# Patient Record
Sex: FEMALE | Race: WHITE | NOT HISPANIC OR LATINO | Employment: FULL TIME | URBAN - METROPOLITAN AREA
[De-identification: names, ages, dates, MRNs, and addresses within clinical notes are randomized per-mention and may not be internally consistent; named-entity substitution may affect disease eponyms.]

---

## 2017-03-01 ENCOUNTER — HOSPITAL ENCOUNTER (EMERGENCY)
Facility: MEDICAL CENTER | Age: 22
End: 2017-03-01
Attending: EMERGENCY MEDICINE
Payer: MEDICAID

## 2017-03-01 ENCOUNTER — APPOINTMENT (OUTPATIENT)
Dept: RADIOLOGY | Facility: MEDICAL CENTER | Age: 22
End: 2017-03-01
Attending: EMERGENCY MEDICINE
Payer: MEDICAID

## 2017-03-01 VITALS
RESPIRATION RATE: 16 BRPM | OXYGEN SATURATION: 94 % | SYSTOLIC BLOOD PRESSURE: 124 MMHG | WEIGHT: 170.42 LBS | TEMPERATURE: 97.3 F | DIASTOLIC BLOOD PRESSURE: 81 MMHG | HEART RATE: 89 BPM | HEIGHT: 70 IN | BODY MASS INDEX: 24.4 KG/M2

## 2017-03-01 DIAGNOSIS — Z3A.01 LESS THAN 8 WEEKS GESTATION OF PREGNANCY: ICD-10-CM

## 2017-03-01 LAB
ALBUMIN SERPL BCP-MCNC: 4.7 G/DL (ref 3.2–4.9)
ALBUMIN/GLOB SERPL: 1.5 G/DL
ALP SERPL-CCNC: 59 U/L (ref 30–99)
ALT SERPL-CCNC: 23 U/L (ref 2–50)
ANION GAP SERPL CALC-SCNC: 11 MMOL/L (ref 0–11.9)
APPEARANCE UR: ABNORMAL
AST SERPL-CCNC: 17 U/L (ref 12–45)
B-HCG SERPL-ACNC: 1022.6 MIU/ML (ref 0–5)
BACTERIA #/AREA URNS HPF: ABNORMAL /HPF
BASOPHILS # BLD AUTO: 0.4 % (ref 0–1.8)
BASOPHILS # BLD: 0.04 K/UL (ref 0–0.12)
BILIRUB SERPL-MCNC: 0.4 MG/DL (ref 0.1–1.5)
BILIRUB UR QL STRIP.AUTO: NEGATIVE
BUN SERPL-MCNC: 8 MG/DL (ref 8–22)
CALCIUM SERPL-MCNC: 9.9 MG/DL (ref 8.5–10.5)
CHLORIDE SERPL-SCNC: 106 MMOL/L (ref 96–112)
CO2 SERPL-SCNC: 23 MMOL/L (ref 20–33)
COLOR UR: ABNORMAL
CREAT SERPL-MCNC: 0.83 MG/DL (ref 0.5–1.4)
EOSINOPHIL # BLD AUTO: 0.05 K/UL (ref 0–0.51)
EOSINOPHIL NFR BLD: 0.5 % (ref 0–6.9)
EPI CELLS #/AREA URNS HPF: ABNORMAL /HPF
ERYTHROCYTE [DISTWIDTH] IN BLOOD BY AUTOMATED COUNT: 39.5 FL (ref 35.9–50)
GFR SERPL CREATININE-BSD FRML MDRD: >60 ML/MIN/1.73 M 2
GLOBULIN SER CALC-MCNC: 3.2 G/DL (ref 1.9–3.5)
GLUCOSE SERPL-MCNC: 88 MG/DL (ref 65–99)
GLUCOSE UR STRIP.AUTO-MCNC: NEGATIVE MG/DL
HCT VFR BLD AUTO: 44.3 % (ref 37–47)
HGB BLD-MCNC: 14.6 G/DL (ref 12–16)
IMM GRANULOCYTES # BLD AUTO: 0.04 K/UL (ref 0–0.11)
IMM GRANULOCYTES NFR BLD AUTO: 0.4 % (ref 0–0.9)
KETONES UR STRIP.AUTO-MCNC: NEGATIVE MG/DL
LEUKOCYTE ESTERASE UR QL STRIP.AUTO: ABNORMAL
LYMPHOCYTES # BLD AUTO: 2.42 K/UL (ref 1–4.8)
LYMPHOCYTES NFR BLD: 26.4 % (ref 22–41)
MCH RBC QN AUTO: 29.4 PG (ref 27–33)
MCHC RBC AUTO-ENTMCNC: 33 G/DL (ref 33.6–35)
MCV RBC AUTO: 89.1 FL (ref 81.4–97.8)
MICRO URNS: ABNORMAL
MONOCYTES # BLD AUTO: 0.46 K/UL (ref 0–0.85)
MONOCYTES NFR BLD AUTO: 5 % (ref 0–13.4)
MUCOUS THREADS #/AREA URNS HPF: ABNORMAL /HPF
NEUTROPHILS # BLD AUTO: 6.15 K/UL (ref 2–7.15)
NEUTROPHILS NFR BLD: 67.3 % (ref 44–72)
NITRITE UR QL STRIP.AUTO: NEGATIVE
NRBC # BLD AUTO: 0 K/UL
NRBC BLD AUTO-RTO: 0 /100 WBC
NUMBER OF RH DOSES IND 8505RD: NORMAL
PH UR STRIP.AUTO: 6 [PH]
PLATELET # BLD AUTO: 280 K/UL (ref 164–446)
PMV BLD AUTO: 9.9 FL (ref 9–12.9)
POTASSIUM SERPL-SCNC: 3.5 MMOL/L (ref 3.6–5.5)
PROT SERPL-MCNC: 7.9 G/DL (ref 6–8.2)
PROT UR QL STRIP: NEGATIVE MG/DL
RBC # BLD AUTO: 4.97 M/UL (ref 4.2–5.4)
RBC # URNS HPF: ABNORMAL /HPF
RBC UR QL AUTO: ABNORMAL
RH BLD: NORMAL
SODIUM SERPL-SCNC: 140 MMOL/L (ref 135–145)
SP GR UR STRIP.AUTO: 1.01
WBC # BLD AUTO: 9.2 K/UL (ref 4.8–10.8)
WBC #/AREA URNS HPF: ABNORMAL /HPF

## 2017-03-01 PROCEDURE — 86901 BLOOD TYPING SEROLOGIC RH(D): CPT

## 2017-03-01 PROCEDURE — 85025 COMPLETE CBC W/AUTO DIFF WBC: CPT

## 2017-03-01 PROCEDURE — 36415 COLL VENOUS BLD VENIPUNCTURE: CPT

## 2017-03-01 PROCEDURE — 99284 EMERGENCY DEPT VISIT MOD MDM: CPT

## 2017-03-01 PROCEDURE — 76817 TRANSVAGINAL US OBSTETRIC: CPT

## 2017-03-01 PROCEDURE — 84702 CHORIONIC GONADOTROPIN TEST: CPT

## 2017-03-01 PROCEDURE — 81001 URINALYSIS AUTO W/SCOPE: CPT

## 2017-03-01 PROCEDURE — 80053 COMPREHEN METABOLIC PANEL: CPT

## 2017-03-01 RX ORDER — NITROFURANTOIN 25; 75 MG/1; MG/1
100 CAPSULE ORAL 2 TIMES DAILY
Qty: 20 CAP | Refills: 0 | Status: SHIPPED | OUTPATIENT
Start: 2017-03-01 | End: 2017-12-18

## 2017-03-01 ASSESSMENT — LIFESTYLE VARIABLES
CONSUMPTION TOTAL: INCOMPLETE
HAVE YOU EVER FELT YOU SHOULD CUT DOWN ON YOUR DRINKING: NO
EVER HAD A DRINK FIRST THING IN THE MORNING TO STEADY YOUR NERVES TO GET RID OF A HANGOVER: NO
DO YOU DRINK ALCOHOL: YES
HAVE PEOPLE ANNOYED YOU BY CRITICIZING YOUR DRINKING: NO
TOTAL SCORE: 0
TOTAL SCORE: 0
EVER FELT BAD OR GUILTY ABOUT YOUR DRINKING: NO
TOTAL SCORE: 0

## 2017-03-01 ASSESSMENT — PAIN SCALES - GENERAL: PAINLEVEL_OUTOF10: 0

## 2017-03-01 NOTE — ED AVS SNAPSHOT
3/1/2017          Camila Enamorado  [address] [place] [address]    Dear Camila:    [facility] wants to ensure your discharge home is safe and you or your loved ones have had all your questions answered regarding your care after you leave the hospital.    You may receive a telephone call within two days of your discharge.  This call is to make certain you understand your discharge instructions as well as ensure we provided you with the best care possible during your stay with us.     The call will only last approximately 3-5 minutes and will be done by a nurse.    Once again, we want to ensure your discharge home is safe and that you have a clear understanding of any next steps in your care.  If you have any questions or concerns, please do not hesitate to contact us, we are here for you.  Thank you for choosing [facility] for your healthcare needs.    Sincerely,    Jimmy Menjivar    [facility]

## 2017-03-01 NOTE — ED AVS SNAPSHOT
[employer] Access Code: [email or web address]  Expires: 3/31/2017 10:19 PM    [employer]  A secure, online tool to manage your health information     [employer]’s [employer]® is a secure, online tool that connects you to your personalized health information from the privacy of your home -- day or night - making it very easy for you to manage your healthcare. Once the activation process is completed, you can even access your medical information using the [employer] matt, which is available for free in the Apple Matt store or Google Play store.     [employer] provides the following levels of access (as shown below):   My Chart Features   [facility] Primary Care Doctor [facility]  Specialists [facility]  Urgent  Care Non-[facility]  Primary Care  Doctor   Email your healthcare team securely and privately 24/7 X X X X   Manage appointments: schedule your next appointment; view details of past/upcoming appointments X      Request prescription refills. X      View recent personal medical records, including lab and immunizations X X X X   View health record, including health history, allergies, medications X X X X   Read reports about your outpatient visits, procedures, consult and ER notes X X X X   See your discharge summary, which is a recap of your hospital and/or ER visit that includes your diagnosis, lab results, and care plan. X X       How to register for [employer]:  1. Go to  [email or web address].  2. Click on the Sign Up Now box, which takes you to the New Member Sign Up page. You will need to provide the following information:  a. Enter your [employer] Access Code exactly as it appears at the top of this page. (You will not need to use this code after you’ve completed the sign-up process. If you do not sign up before the expiration date, you must request a new code.)   b. Enter your date of birth.   c. Enter your home email address.   d. Click Submit, and follow the next screen’s instructions.  3. Create a [employer] ID. This will be your [employer]  login ID and cannot be changed, so think of one that is secure and easy to remember.  4. Create a [facility] password. You can change your password at any time.  5. Enter your Password Reset Question and Answer. This can be used at a later time if you forget your password.   6. Enter your e-mail address. This allows you to receive e-mail notifications when new information is available in [facility].  7. Click Sign Up. You can now view your health information.    For assistance activating your [facility] account, call [phone]

## 2017-03-01 NOTE — ED AVS SNAPSHOT
Home Care Instructions                                                                                                                Camila Enamorado   MRN: [id number]    Department:  [facility], Emergency Dept   Date of Visit:  3/1/2017            [facility], Emergency Dept    [address] [place] [id number]    Phone:  [phone]      You were seen by     Arpit Koehler M.D.      Your Diagnosis Was     Less than 8 weeks gestation of pregnancy     Z3A.01       Follow-up Information     1. Follow up with [facility], Emergency Dept In 2 days.    Specialty:  Emergency Medicine    Why:  return sooner for increasing pain progressive vaginal bleeding or other concerns    Contact information    [address] [place] [id number] [phone]      Medication Information     Review all of your home medications and newly ordered medications with your primary doctor and/or pharmacist as soon as possible. Follow medication instructions as directed by your doctor and/or pharmacist.     Please keep your complete medication list with you and share with your physician. Update the information when medications are discontinued, doses are changed, or new medications (including over-the-counter products) are added; and carry medication information at all times in the event of emergency situations.               Medication List      START taking these medications        Instructions    nitrofurantoin monohydr macro 100 MG Caps   Commonly known as:  MACROBID    Take 1 Cap by mouth 2 times a day.   Dose:  100 mg         ASK your doctor about these medications        Instructions    PRENATAL 1 PO    Take  by mouth.               Procedures and tests performed during your visit     CBC WITH DIFFERENTIAL    COMP METABOLIC PANEL    ESTIMATED GFR    HCG QUANTITATIVE SERUM    RH TYPE FOR RHOGAM FROM E.D.    Set Up for Pelvic Exam    URINALYSIS (UA)    URINE MICROSCOPIC (W/UA)     US-OB PELVIS TRANSVAGINAL        Discharge Instructions       Follow up with your primary care physician for re-evaluation of your blood pressure.Pregnancy  If you are planning on getting pregnant, it is a good idea to make a preconception appointment with your caregiver to discuss having a healthy lifestyle before getting pregnant. This includes diet, weight, exercise, taking prenatal vitamins (especially folic acid, which helps prevent brain and spinal cord defects), avoiding alcohol, smoking and illegal drugs, medical problems (diabetes, convulsions), family history of genetic problems, working conditions, and immunizations. It is better to have knowledge of these things and do something about them before getting pregnant.  During your pregnancy, it is important to follow certain guidelines in order to have a healthy baby. It is very important to get good prenatal care and follow your caregiver's instructions. Prenatal care includes all the medical care you receive before your baby's birth. This helps to prevent problems during the pregnancy and childbirth.  HOME CARE INSTRUCTIONS   · Start your prenatal visits by the 12th week of pregnancy or earlier, if possible. At first, appointments are usually scheduled monthly. They become more frequent in the last 2 months before delivery. It is important that you keep your caregiver's appointments and follow your caregiver's instructions regarding medication use, exercise, and diet.  · During pregnancy, you are providing food for you and your baby. Eat a regular, well-balanced diet. Choose foods such as meat, fish, milk and other dairy products, vegetables, fruits, whole-grain breads and cereals. Your caregiver will inform you of the ideal weight gain depending on your current height and weight. Drink lots of liquids. Try to drink 8 glasses of water a day.  · Alcohol is associated with a number of birth defects including fetal alcohol syndrome. It is best to avoid  alcohol completely. Smoking will cause low birth rate and prematurity. Use of alcohol and nicotine during your pregnancy also increases the chances that your child will be chemically dependent later in their life and may contribute to SIDS (Sudden Infant Death Syndrome).  · Do not use illegal drugs.  · Only take prescription or over-the-counter medications that are recommended by your caregiver. Other medications can cause genetic and physical problems in the baby.  · Morning sickness can often be helped by keeping soda crackers at the bedside. Eat a few before getting up in the morning.  · A sexual relationship may be continued until near the end of pregnancy if there are no other problems such as early (premature) leaking of amniotic fluid from the membranes, vaginal bleeding, painful intercourse or belly (abdominal) pain.  · Exercise regularly. Check with your caregiver if you are unsure of the safety of some of your exercises.  · Do not use hot tubs, steam rooms or saunas. These increase the risk of fainting and hurting yourself and the baby. Swimming is OK for exercise. Get plenty of rest, including afternoon naps when possible, especially in the third trimester.  · Avoid toxic odors and chemicals.  · Do not wear high heels. They may cause you to lose your balance and fall.  · Do not lift over 5 pounds. If you do lift anything, lift with your legs and thighs, not your back.  · Avoid long trips, especially in the third trimester.  · If you have to travel out of the city or state, take a copy of your medical records with you.  SEEK IMMEDIATE MEDICAL CARE IF:   · You develop an unexplained oral temperature above 102° F (38.9° C), or as your caregiver suggests.  · You have leaking of fluid from the vagina. If leaking membranes are suspected, take your temperature and inform your caregiver of this when you call.  · There is vaginal spotting or bleeding. Notify your caregiver of the amount and how many pads are  used.  · You continue to feel sick to your stomach (nauseous) and have no relief from remedies suggested, or you throw up (vomit) blood or coffee ground like materials.  · You develop upper abdominal pain.  · You have round ligament discomfort in the lower abdominal area. This still must be evaluated by your caregiver.  · You feel contractions of the uterus.  · You do not feel the baby move, or there is less movement than before.  · You have painful urination.  · You have abnormal vaginal discharge.  · You have persistent diarrhea.  · You get a severe headache.  · You have problems with your vision.  · You develop muscle weakness.  · You feel dizzy and faint.  · You develop shortness of breath.  · You develop chest pain.  · You have back pain that travels down to your leg and feet.  · You feel irregular or a very fast heartbeat.  · You develop excessive weight gain in a short period of time (5 pounds in 3 to 5 days).  · You are involved in a domestic violence situation.  Document Released: 12/18/2006 Document Revised: 06/18/2013 Document Reviewed: 06/11/2010  [employer]® Patient Information ©2014 [employer].            Patient Information     Patient Information    Following emergency treatment: all patient requiring follow-up care must return either to a private physician or a clinic if your condition worsens before you are able to obtain further medical attention, please return to the emergency room.     Billing Information    At [facility], we work to make the billing process streamlined for our patients.  Our Representatives are here to answer any questions you may have regarding your hospital bill.  If you have insurance coverage and have supplied your insurance information to us, we will submit a claim to your insurer on your behalf.  Should you have any questions regarding your bill, we can be reached online or by phone as follows:  Online: You are able pay your bills online or live chat with our  representatives about any billing questions you may have. We are here to help Monday - Friday from 8:00am to 7:30pm and 9:00am - 12:00pm on Saturdays.  Please visit [email or web address]  for more information.   Phone:  [phone] or [phone]    Please note that your emergency physician, surgeon, pathologist, radiologist, anesthesiologist, and other specialists are not employed by [facility] and will therefore bill separately for their services.  Please contact them directly for any questions concerning their bills at the numbers below:     Emergency Physician Services:  [phone]  [facility]:  [phone]  [facility]:  [phone]  [facility]:  [phone]    1. Your final bill may vary from the amount quoted upon discharge if all procedures are not complete at that time, or if your doctor has additional procedures of which we are not aware. You will receive an additional bill if you return to the Emergency Department at [facility] for suture removal regardless of the facility of which the sutures were placed.     2. Please arrange for settlement of this account at the emergency registration.    3. All self-pay accounts are due in full at the time of treatment.  If you are unable to meet this obligation then payment is expected within 4-5 days.     4. If you have had radiology studies (CT, X-ray, Ultrasound, MRI), you have received a preliminary result during your emergency department visit. Please contact the radiology department [phone] to receive a copy of your final result. Please discuss the Final result with your primary physician or with the follow up physician provided.     Crisis Hotline:  [place] Crisis Hotline:  3-876-NQRXTBQ or 1-671.430.7136  [place] Crisis Hotline:    1-531.973.3377 or 881-732-3263         ED Discharge Follow Up Questions    1. In order to provide you with very good care, we would  like to follow up with a phone call in the next few days.  May we have your permission to contact you?     YES /  NO    2. What is the best phone number to call you? (       )_____-__________    3. What is the best time to call you?      Morning  /  Afternoon  /  Evening                   Patient Signature:  ____________________________________________________________    Date:  ____________________________________________________________

## 2017-03-02 NOTE — ED NOTES
Discharge orders received, IV and monitor discontinued, instructions and education given, follow-up appointments discussed, prescription x1 given, pt verbalized understanding.

## 2017-03-02 NOTE — DISCHARGE INSTRUCTIONS
Follow up with your primary care physician for re-evaluation of your blood pressure.Pregnancy  If you are planning on getting pregnant, it is a good idea to make a preconception appointment with your caregiver to discuss having a healthy lifestyle before getting pregnant. This includes diet, weight, exercise, taking prenatal vitamins (especially folic acid, which helps prevent brain and spinal cord defects), avoiding alcohol, smoking and illegal drugs, medical problems (diabetes, convulsions), family history of genetic problems, working conditions, and immunizations. It is better to have knowledge of these things and do something about them before getting pregnant.  During your pregnancy, it is important to follow certain guidelines in order to have a healthy baby. It is very important to get good prenatal care and follow your caregiver's instructions. Prenatal care includes all the medical care you receive before your baby's birth. This helps to prevent problems during the pregnancy and childbirth.  HOME CARE INSTRUCTIONS   · Start your prenatal visits by the 12th week of pregnancy or earlier, if possible. At first, appointments are usually scheduled monthly. They become more frequent in the last 2 months before delivery. It is important that you keep your caregiver's appointments and follow your caregiver's instructions regarding medication use, exercise, and diet.  · During pregnancy, you are providing food for you and your baby. Eat a regular, well-balanced diet. Choose foods such as meat, fish, milk and other dairy products, vegetables, fruits, whole-grain breads and cereals. Your caregiver will inform you of the ideal weight gain depending on your current height and weight. Drink lots of liquids. Try to drink 8 glasses of water a day.  · Alcohol is associated with a number of birth defects including fetal alcohol syndrome. It is best to avoid alcohol completely. Smoking will cause low birth rate and  prematurity. Use of alcohol and nicotine during your pregnancy also increases the chances that your child will be chemically dependent later in their life and may contribute to SIDS (Sudden Infant Death Syndrome).  · Do not use illegal drugs.  · Only take prescription or over-the-counter medications that are recommended by your caregiver. Other medications can cause genetic and physical problems in the baby.  · Morning sickness can often be helped by keeping soda crackers at the bedside. Eat a few before getting up in the morning.  · A sexual relationship may be continued until near the end of pregnancy if there are no other problems such as early (premature) leaking of amniotic fluid from the membranes, vaginal bleeding, painful intercourse or belly (abdominal) pain.  · Exercise regularly. Check with your caregiver if you are unsure of the safety of some of your exercises.  · Do not use hot tubs, steam rooms or saunas. These increase the risk of fainting and hurting yourself and the baby. Swimming is OK for exercise. Get plenty of rest, including afternoon naps when possible, especially in the third trimester.  · Avoid toxic odors and chemicals.  · Do not wear high heels. They may cause you to lose your balance and fall.  · Do not lift over 5 pounds. If you do lift anything, lift with your legs and thighs, not your back.  · Avoid long trips, especially in the third trimester.  · If you have to travel out of the city or state, take a copy of your medical records with you.  SEEK IMMEDIATE MEDICAL CARE IF:   · You develop an unexplained oral temperature above 102° F (38.9° C), or as your caregiver suggests.  · You have leaking of fluid from the vagina. If leaking membranes are suspected, take your temperature and inform your caregiver of this when you call.  · There is vaginal spotting or bleeding. Notify your caregiver of the amount and how many pads are used.  · You continue to feel sick to your stomach (nauseous)  and have no relief from remedies suggested, or you throw up (vomit) blood or coffee ground like materials.  · You develop upper abdominal pain.  · You have round ligament discomfort in the lower abdominal area. This still must be evaluated by your caregiver.  · You feel contractions of the uterus.  · You do not feel the baby move, or there is less movement than before.  · You have painful urination.  · You have abnormal vaginal discharge.  · You have persistent diarrhea.  · You get a severe headache.  · You have problems with your vision.  · You develop muscle weakness.  · You feel dizzy and faint.  · You develop shortness of breath.  · You develop chest pain.  · You have back pain that travels down to your leg and feet.  · You feel irregular or a very fast heartbeat.  · You develop excessive weight gain in a short period of time (5 pounds in 3 to 5 days).  · You are involved in a domestic violence situation.  Document Released: 12/18/2006 Document Revised: 06/18/2013 Document Reviewed: 06/11/2010  [employer]® Patient Information ©2014 [employer], [employer].

## 2017-03-02 NOTE — ED PROVIDER NOTES
ED Provider Note    Scribed for Arpit Koehler M.D. by Yousuf Bird. 3/1/2017, 7:54 PM.    Primary care provider: SAWYER Walker  Means of arrival: Walk-in  History obtained from: Patient  History limited by: None    CHIEF COMPLAINT  Chief Complaint   Patient presents with   • Abdominal Cramping   • Vaginal Bleeding   • Pregnancy     approx 6 wks pregnant       ALIYA Enamorado is a 21 y.o. female who presents to the Emergency Department with abdominal cramping, back pain, and vaginal bleeding onset 3 nights ago. The patient reports she is currently 6 weeks pregnant and reports of experiencing a small amount of vaginal bleeding 3 nights ago with associated abdominal cramping and back pain. She reports moderate amount of bleeding yesterday with small pieces of clots present, but states the bleeding began to lighten up today. Patient notes intermittent lightheadedness, but denies nausea, vomiting, or diarrhea. She is G1, with last menstrual period 1/17/2017. She notes allergy to penicillin.     REVIEW OF SYSTEMS  Pertinent positives include vaginal bleeding, abdominal cramping, back pain, and intermittent lightheadedness. Pertinent negatives include no nausea, vomiting, and diarrhea.  All other systems reviewed and negative. C.     PAST MEDICAL HISTORY  She is G1.    SURGICAL HISTORY  patient denies any surgical history    SOCIAL HISTORY  Social History   Substance Use Topics   • Smoking status: Never Smoker    • Smokeless tobacco: None   • Alcohol Use: No      History   Drug Use No       FAMILY HISTORY  Family History   Problem Relation Age of Onset   • Cancer Mother      hodkins lymphoma       CURRENT MEDICATIONS  Home Medications     Reviewed by Jane Degroot R.N. (Registered Nurse) on 03/01/17 at 1646  Med List Status: Partial    Medication Last Dose Status    Prenatal MV-Min-Fe Fum-FA-DHA (PRENATAL 1 PO) 3/1/2017 Active                ALLERGIES  Allergies   Allergen Reactions   • Pcn [Penicillins]   "      PHYSICAL EXAM  VITAL SIGNS: /82 mmHg  Pulse 104  Temp(Src) 36.3 °C (97.3 °F)  Resp 18  Ht 1.778 m (5' 10\")  Wt 77.3 kg (170 lb 6.7 oz)  BMI 24.45 kg/m2  SpO2 100%  LMP 01/17/2017    Constitutional: Well developed, Well nourished, No acute distress, Non-toxic appearance.   HENT: Normocephalic, Atraumatic, mucous membranes moist, no erythema, exudates, swelling, or masses, nares patent  Eyes: nonicteric  Neck: Supple, no meningismus  Lymphatic: No lymphadenopathy noted.   Cardiovascular: Regular rate and rhythm, no gallops rubs or murmurs  Lungs: Clear bilaterally   Abdomen: Bowel sounds normal, Soft, No tenderness  Skin: Warm, Dry, no rash  Back: No tenderness, No CVA tenderness.   Pelvic Exam: Assisted by female nurse, cervical os closed, minimal bleeding.   Rectal: Deferred  Extremities: No edema  Neurologic: Alert, appropriate, follows commands, moving all extremities, normal speech   Psychiatric: Affect normal    DIAGNOSTIC STUDIES / PROCEDURES    LABS  Results for orders placed or performed during the hospital encounter of 03/01/17   CBC WITH DIFFERENTIAL   Result Value Ref Range    WBC 9.2 4.8 - 10.8 K/uL    RBC 4.97 4.20 - 5.40 M/uL    Hemoglobin 14.6 12.0 - 16.0 g/dL    Hematocrit 44.3 37.0 - 47.0 %    MCV 89.1 81.4 - 97.8 fL    MCH 29.4 27.0 - 33.0 pg    MCHC 33.0 (L) 33.6 - 35.0 g/dL    RDW 39.5 35.9 - 50.0 fL    Platelet Count 280 164 - 446 K/uL    MPV 9.9 9.0 - 12.9 fL    Neutrophils-Polys 67.30 44.00 - 72.00 %    Lymphocytes 26.40 22.00 - 41.00 %    Monocytes 5.00 0.00 - 13.40 %    Eosinophils 0.50 0.00 - 6.90 %    Basophils 0.40 0.00 - 1.80 %    Immature Granulocytes 0.40 0.00 - 0.90 %    Nucleated RBC 0.00 /100 WBC    Neutrophils (Absolute) 6.15 2.00 - 7.15 K/uL    Lymphs (Absolute) 2.42 1.00 - 4.80 K/uL    Monos (Absolute) 0.46 0.00 - 0.85 K/uL    Eos (Absolute) 0.05 0.00 - 0.51 K/uL    Baso (Absolute) 0.04 0.00 - 0.12 K/uL    Immature Granulocytes (abs) 0.04 0.00 - 0.11 K/uL    " NRBC (Absolute) 0.00 K/uL   COMP METABOLIC PANEL   Result Value Ref Range    Sodium 140 135 - 145 mmol/L    Potassium 3.5 (L) 3.6 - 5.5 mmol/L    Chloride 106 96 - 112 mmol/L    Co2 23 20 - 33 mmol/L    Anion Gap 11.0 0.0 - 11.9    Glucose 88 65 - 99 mg/dL    Bun 8 8 - 22 mg/dL    Creatinine 0.83 0.50 - 1.40 mg/dL    Calcium 9.9 8.5 - 10.5 mg/dL    AST(SGOT) 17 12 - 45 U/L    ALT(SGPT) 23 2 - 50 U/L    Alkaline Phosphatase 59 30 - 99 U/L    Total Bilirubin 0.4 0.1 - 1.5 mg/dL    Albumin 4.7 3.2 - 4.9 g/dL    Total Protein 7.9 6.0 - 8.2 g/dL    Globulin 3.2 1.9 - 3.5 g/dL    A-G Ratio 1.5 g/dL   URINALYSIS (UA)   Result Value Ref Range    Micro Urine Req Microscopic     Color Lt. Yellow     Character Sl Cloudy (A)     Specific Gravity 1.013 <1.035    Ph 6.0 5.0-8.0    Glucose Negative Negative mg/dL    Ketones Negative Negative mg/dL    Protein Negative Negative mg/dL    Bilirubin Negative Negative    Nitrite Negative Negative    Leukocyte Esterase Moderate (A) Negative    Occult Blood Moderate (A) Negative   RH TYPE FOR RHOGAM FROM E.D.   Result Value Ref Range    Emergency Department Rh Typing POS     Number Of Rh Doses Indicated ZERO    HCG QUANTITATIVE SERUM   Result Value Ref Range    Bhcg 1022.6 (H) 0.0 - 5.0 mIU/mL   ESTIMATED GFR   Result Value Ref Range    GFR If African American >60 >60 mL/min/1.73 m 2    GFR If Non African American >60 >60 mL/min/1.73 m 2   URINE MICROSCOPIC (W/UA)   Result Value Ref Range    WBC 10-20 (A) /hpf    RBC 2-5 (A) /hpf    Bacteria Moderate (A) None /hpf    Epithelial Cells Few /hpf    Mucous Threads Few /hpf   All labs reviewed by me.    RADIOLOGY  US-OB PELVIS TRANSVAGINAL   Final Result      Possible intrauterine gestational sac with mean sac diameter corresponding to an estimated gestational age of 5 weeks 0 days for an LEVAR of 11/1/2017. Fetal pole and yolk sac are not identified. Findings could be consistent with pregnancy too early to    diagnose versus early pregnancy  failure versus anembryonic pregnancy versus ectopic pregnancy. Follow-up is recommended.      The radiologist's interpretation of all radiological studies have been reviewed by me.    COURSE & MEDICAL DECISION MAKING  Nursing notes, VS, PMSFHx reviewed in chart.     8:04 M Patient seen and examined at bedside. Ordered for US-OB pelvis transvaginal, CBC, CMP, urinalysis UA, RH type for rhogam, and HCG quant serum to evaluate.     10:07 PM Reviewed the patient's lab and imaging results, which are noted above. Patient was reevaluated at bedside. She is resting comfortably in bed without medical complaints. Performed pelvic exam with assisted female nurse, see above physical for further details. Discussed lab and radiology results with the patient and informed them of the results, which are shown above. Advised the patient recheck with her PCP in 48 hours. The patient will be discharged  and should return if symptoms worsen or if new symptoms arise. The patient understands and agrees to plan.       Decision Making:  This is a 21 y.o. year old female who presents with vaginal bleeding. Ultrasound demonstrates what may be a gestational sac but there is no yolk sac or fetal pole. Given the low quant at around thousand, it may be too early to see whether this is a completed miscarriage or whether it is too early to tell that there is an intrauterine pregnancy. There is no evidence of ectopic. Patient will be discharged to follow-up in 48 hours for recheck. She also will be treated for UTI.    The patient will return for new or worsening symptoms and is stable at the time of discharge.    The patient is referred to a primary physician for blood pressure management, diabetic screening, and for all other preventative health concerns.    DISPOSITION:  Patient will be discharged home in stable condition.    FOLLOW UP:  [facility], Emergency Dept  [address] [facility] [address] [address] [phone]  In 2  days  return sooner for increasing pain progressive vaginal bleeding or other concerns      OUTPATIENT MEDICATIONS:  New Prescriptions    NITROFURANTOIN MONOHYDR MACRO (MACROBID) 100 MG CAP    Take 1 Cap by mouth 2 times a day.     FINAL IMPRESSION  1. Less than 8 weeks gestation of pregnancy          Yousuf HUFFMAN (Scribe), am scribing for, and in the presence of, Arpit Koehler M.D..    Electronically signed by: Yousuf Bird (Scribe), 3/1/2017    Arpit HUFFMAN M.D. personally performed the services described in this documentation, as scribed by Yousuf Bird in my presence, and it is both accurate and complete.    The note accurately reflects work and decisions made by me.  Arpit Koehler  3/1/2017  10:14 PM

## 2017-03-02 NOTE — ED NOTES
Pt to triage .  Chief Complaint   Patient presents with   • Abdominal Cramping   • Vaginal Bleeding   • Pregnancy     approx 6 wks pregnant

## 2017-03-04 ENCOUNTER — HOSPITAL ENCOUNTER (EMERGENCY)
Facility: MEDICAL CENTER | Age: 22
End: 2017-03-04
Attending: EMERGENCY MEDICINE
Payer: MEDICAID

## 2017-03-04 ENCOUNTER — APPOINTMENT (OUTPATIENT)
Dept: RADIOLOGY | Facility: MEDICAL CENTER | Age: 22
End: 2017-03-04
Attending: EMERGENCY MEDICINE
Payer: MEDICAID

## 2017-03-04 VITALS
DIASTOLIC BLOOD PRESSURE: 63 MMHG | RESPIRATION RATE: 16 BRPM | TEMPERATURE: 97.8 F | WEIGHT: 171.74 LBS | BODY MASS INDEX: 24.59 KG/M2 | HEART RATE: 82 BPM | SYSTOLIC BLOOD PRESSURE: 108 MMHG | HEIGHT: 70 IN | OXYGEN SATURATION: 100 %

## 2017-03-04 DIAGNOSIS — O20.9 VAGINAL BLEEDING IN PREGNANCY, FIRST TRIMESTER: ICD-10-CM

## 2017-03-04 LAB
ANION GAP SERPL CALC-SCNC: 4 MMOL/L (ref 0–11.9)
B-HCG SERPL-ACNC: 1137.1 MIU/ML (ref 0–5)
BASOPHILS # BLD AUTO: 0.4 % (ref 0–1.8)
BASOPHILS # BLD: 0.03 K/UL (ref 0–0.12)
BUN SERPL-MCNC: 7 MG/DL (ref 8–22)
CALCIUM SERPL-MCNC: 9.5 MG/DL (ref 8.5–10.5)
CHLORIDE SERPL-SCNC: 105 MMOL/L (ref 96–112)
CO2 SERPL-SCNC: 26 MMOL/L (ref 20–33)
CREAT SERPL-MCNC: 0.68 MG/DL (ref 0.5–1.4)
EOSINOPHIL # BLD AUTO: 0.08 K/UL (ref 0–0.51)
EOSINOPHIL NFR BLD: 1.1 % (ref 0–6.9)
ERYTHROCYTE [DISTWIDTH] IN BLOOD BY AUTOMATED COUNT: 39.6 FL (ref 35.9–50)
GFR SERPL CREATININE-BSD FRML MDRD: >60 ML/MIN/1.73 M 2
GLUCOSE SERPL-MCNC: 95 MG/DL (ref 65–99)
HCT VFR BLD AUTO: 42.2 % (ref 37–47)
HGB BLD-MCNC: 14 G/DL (ref 12–16)
IMM GRANULOCYTES # BLD AUTO: 0.02 K/UL (ref 0–0.11)
IMM GRANULOCYTES NFR BLD AUTO: 0.3 % (ref 0–0.9)
LYMPHOCYTES # BLD AUTO: 1.56 K/UL (ref 1–4.8)
LYMPHOCYTES NFR BLD: 21.1 % (ref 22–41)
MCH RBC QN AUTO: 29.9 PG (ref 27–33)
MCHC RBC AUTO-ENTMCNC: 33.2 G/DL (ref 33.6–35)
MCV RBC AUTO: 90 FL (ref 81.4–97.8)
MONOCYTES # BLD AUTO: 0.4 K/UL (ref 0–0.85)
MONOCYTES NFR BLD AUTO: 5.4 % (ref 0–13.4)
NEUTROPHILS # BLD AUTO: 5.29 K/UL (ref 2–7.15)
NEUTROPHILS NFR BLD: 71.7 % (ref 44–72)
NRBC # BLD AUTO: 0 K/UL
NRBC BLD AUTO-RTO: 0 /100 WBC
PLATELET # BLD AUTO: 249 K/UL (ref 164–446)
PMV BLD AUTO: 9.9 FL (ref 9–12.9)
POTASSIUM SERPL-SCNC: 3.8 MMOL/L (ref 3.6–5.5)
RBC # BLD AUTO: 4.69 M/UL (ref 4.2–5.4)
SODIUM SERPL-SCNC: 135 MMOL/L (ref 135–145)
WBC # BLD AUTO: 7.4 K/UL (ref 4.8–10.8)

## 2017-03-04 PROCEDURE — 85025 COMPLETE CBC W/AUTO DIFF WBC: CPT

## 2017-03-04 PROCEDURE — 80048 BASIC METABOLIC PNL TOTAL CA: CPT

## 2017-03-04 PROCEDURE — 99284 EMERGENCY DEPT VISIT MOD MDM: CPT

## 2017-03-04 PROCEDURE — 84702 CHORIONIC GONADOTROPIN TEST: CPT

## 2017-03-04 PROCEDURE — 76817 TRANSVAGINAL US OBSTETRIC: CPT

## 2017-03-04 PROCEDURE — 36415 COLL VENOUS BLD VENIPUNCTURE: CPT

## 2017-03-04 NOTE — ED AVS SNAPSHOT
3/4/2017          Camila Enamorado  [address] [place] [address]    Dear Camila:    [facility] wants to ensure your discharge home is safe and you or your loved ones have had all your questions answered regarding your care after you leave the hospital.    You may receive a telephone call within two days of your discharge.  This call is to make certain you understand your discharge instructions as well as ensure we provided you with the best care possible during your stay with us.     The call will only last approximately 3-5 minutes and will be done by a nurse.    Once again, we want to ensure your discharge home is safe and that you have a clear understanding of any next steps in your care.  If you have any questions or concerns, please do not hesitate to contact us, we are here for you.  Thank you for choosing [facility] for your healthcare needs.    Sincerely,    Jimmy Menjivar    [facility]

## 2017-03-04 NOTE — ED AVS SNAPSHOT
Your [employer] Access Code: [email or web address]  Expires: 3/31/2017 10:19 PM    Your Dollar Matters  A secure, online tool to manage your health information     [employer]’s Your Dollar Matters® is a secure, online tool that connects you to your personalized health information from the privacy of your home -- day or night - making it very easy for you to manage your healthcare. Once the activation process is completed, you can even access your medical information using the Your Dollar Matters matt, which is available for free in the Apple Matt store or Google Play store.     Your [employer] Matters provides the following levels of access (as shown below):   My Chart Features   [facility] Primary Care Doctor [facility]  Specialists [facility]  Urgent  Care Non-[facility]  Primary Care  Doctor   Email your healthcare team securely and privately 24/7 X X X X   Manage appointments: schedule your next appointment; view details of past/upcoming appointments X      Request prescription refills. X      View recent personal medical records, including lab and immunizations X X X X   View health record, including health history, allergies, medications X X X X   Read reports about your outpatient visits, procedures, consult and ER notes X X X X   See your discharge summary, which is a recap of your hospital and/or ER visit that includes your diagnosis, lab results, and care plan. X X       How to register for Your Dollar Matters:  1. Go to  [email or web address].  2. Click on the Sign Up Now box, which takes you to the New Member Sign Up page. You will need to provide the following information:  a. Enter your Your Dollar Matters Access Code exactly as it appears at the top of this page. (You will not need to use this code after you’ve completed the sign-up process. If you do not sign up before the expiration date, you must request a new code.)   b. Enter your date of birth.   c. Enter your home email address.   d. Click Submit, and follow the next screen’s instructions.  3. Create a Your Dollar Matters ID. This will be your Your Dollar Matters  login ID and cannot be changed, so think of one that is secure and easy to remember.  4. Create a [employer] password. You can change your password at any time.  5. Enter your Password Reset Question and Answer. This can be used at a later time if you forget your password.   6. Enter your e-mail address. This allows you to receive e-mail notifications when new information is available in [employer].  7. Click Sign Up. You can now view your health information.    For assistance activating your [employer] account, call [phone]

## 2017-03-04 NOTE — ED NOTES
Received report from RN Kristen,pt sitting up comfortably, bed in lowered position, side rails up, call light in reach

## 2017-03-04 NOTE — ED NOTES
Chief Complaint   Patient presents with   • Vaginal Bleeding     Pt BIB self to triage for c/o vag bleed since Mon, pt reports recent visit on Wed, told to come back for recheck. Pt reports bright red blood/ 2-3 liner pads a days/ pt deneis cramping now.     Explained to pt triage process, made pt aware to tell this RN of any changes/concerns, pt verbalized understanding of process and instructions given. Pt to ER kin.

## 2017-03-04 NOTE — DISCHARGE INSTRUCTIONS
Vaginal Bleeding During Pregnancy, First Trimester  A small amount of bleeding (spotting) from the vagina is relatively common in early pregnancy. It usually stops on its own. Various things may cause bleeding or spotting in early pregnancy. Some bleeding may be related to the pregnancy, and some may not. In most cases, the bleeding is normal and is not a problem. However, bleeding can also be a sign of something serious. Be sure to tell your health care provider about any vaginal bleeding right away.  Some possible causes of vaginal bleeding during the first trimester include:  · Infection or inflammation of the cervix.  · Growths (polyps) on the cervix.  · Miscarriage or threatened miscarriage.  · Pregnancy tissue has developed outside of the uterus and in a fallopian tube (tubal pregnancy).  · Tiny cysts have developed in the uterus instead of pregnancy tissue (molar pregnancy).  HOME CARE INSTRUCTIONS   Watch your condition for any changes. The following actions may help to lessen any discomfort you are feeling:  · Follow your health care provider's instructions for limiting your activity. If your health care provider orders bed rest, you may need to stay in bed and only get up to use the bathroom. However, your health care provider may allow you to continue light activity.  · If needed, make plans for someone to help with your regular activities and responsibilities while you are on bed rest.  · Keep track of the number of pads you use each day, how often you change pads, and how soaked (saturated) they are. Write this down.  · Do not use tampons. Do not douche.  · Do not have sexual intercourse or orgasms until approved by your health care provider.  · If you pass any tissue from your vagina, save the tissue so you can show it to your health care provider.  · Only take over-the-counter or prescription medicines as directed by your health care provider.  · Do not take aspirin because it can make you  bleed.  · Keep all follow-up appointments as directed by your health care provider.  SEEK MEDICAL CARE IF:  · You have any vaginal bleeding during any part of your pregnancy.  · You have cramps or labor pains.  · You have a fever, not controlled by medicine.  SEEK IMMEDIATE MEDICAL CARE IF:   · You have severe cramps in your back or belly (abdomen).  · You pass large clots or tissue from your vagina.  · Your bleeding increases.  · You feel light-headed or weak, or you have fainting episodes.  · You have chills.  · You are leaking fluid or have a gush of fluid from your vagina.  · You pass out while having a bowel movement.  MAKE SURE YOU:  · Understand these instructions.  · Will watch your condition.  · Will get help right away if you are not doing well or get worse.     This information is not intended to replace advice given to you by your health care provider. Make sure you discuss any questions you have with your health care provider.     Document Released: 09/27/2006 Document Revised: 12/23/2014 Document Reviewed: 08/25/2014  [employer] Interactive Patient Education ©2016 [employer]

## 2017-03-04 NOTE — ED NOTES
Patient ambulatory to BR w/steady gait, given urine specimen cup and clean catch instructions, to GR 39, changing into gown, chart up for ERP

## 2017-03-04 NOTE — ED AVS SNAPSHOT
Home Care Instructions                                                                                                                Camila Enamorado   MRN: [id number]    Department:  [facility], Emergency Dept   Date of Visit:  3/4/2017            [facility], Emergency Dept    [address] [facility] [address]    Phone:  [phone]      You were seen by     Fabrice Roland D.O.      Your Diagnosis Was     Vaginal bleeding in pregnancy, first trimester     O46.91       Follow-up Information     1. Follow up with [facility], Emergency Dept In 2 days.    Specialty:  Emergency Medicine    Contact information    [address] [facility] [address] [phone]        2. Follow up with Ivan Mazariegos M.D..    Specialty:  OB/Gyn    Contact information    [address]  [address]  [facility] [address] [phone]        Medication Information     Review all of your home medications and newly ordered medications with your primary doctor and/or pharmacist as soon as possible. Follow medication instructions as directed by your doctor and/or pharmacist.     Please keep your complete medication list with you and share with your physician. Update the information when medications are discontinued, doses are changed, or new medications (including over-the-counter products) are added; and carry medication information at all times in the event of emergency situations.               Medication List      ASK your doctor about these medications        Instructions    nitrofurantoin monohydr macro 100 MG Caps   Commonly known as:  MACROBID    Take 1 Cap by mouth 2 times a day.   Dose:  100 mg       PRENATAL 1 PO    Take  by mouth.               Procedures and tests performed during your visit     BASIC METABOLIC PANEL    CBC WITH DIFFERENTIAL    ESTIMATED GFR    HCG QUANTITATIVE    US-OB PELVIS TRANSVAGINAL        Discharge Instructions       Vaginal Bleeding  During Pregnancy, First Trimester  A small amount of bleeding (spotting) from the vagina is relatively common in early pregnancy. It usually stops on its own. Various things may cause bleeding or spotting in early pregnancy. Some bleeding may be related to the pregnancy, and some may not. In most cases, the bleeding is normal and is not a problem. However, bleeding can also be a sign of something serious. Be sure to tell your health care provider about any vaginal bleeding right away.  Some possible causes of vaginal bleeding during the first trimester include:  · Infection or inflammation of the cervix.  · Growths (polyps) on the cervix.  · Miscarriage or threatened miscarriage.  · Pregnancy tissue has developed outside of the uterus and in a fallopian tube (tubal pregnancy).  · Tiny cysts have developed in the uterus instead of pregnancy tissue (molar pregnancy).  HOME CARE INSTRUCTIONS   Watch your condition for any changes. The following actions may help to lessen any discomfort you are feeling:  · Follow your health care provider's instructions for limiting your activity. If your health care provider orders bed rest, you may need to stay in bed and only get up to use the bathroom. However, your health care provider may allow you to continue light activity.  · If needed, make plans for someone to help with your regular activities and responsibilities while you are on bed rest.  · Keep track of the number of pads you use each day, how often you change pads, and how soaked (saturated) they are. Write this down.  · Do not use tampons. Do not douche.  · Do not have sexual intercourse or orgasms until approved by your health care provider.  · If you pass any tissue from your vagina, save the tissue so you can show it to your health care provider.  · Only take over-the-counter or prescription medicines as directed by your health care provider.  · Do not take aspirin because it can make you bleed.  · Keep all follow-up  appointments as directed by your health care provider.  SEEK MEDICAL CARE IF:  · You have any vaginal bleeding during any part of your pregnancy.  · You have cramps or labor pains.  · You have a fever, not controlled by medicine.  SEEK IMMEDIATE MEDICAL CARE IF:   · You have severe cramps in your back or belly (abdomen).  · You pass large clots or tissue from your vagina.  · Your bleeding increases.  · You feel light-headed or weak, or you have fainting episodes.  · You have chills.  · You are leaking fluid or have a gush of fluid from your vagina.  · You pass out while having a bowel movement.  MAKE SURE YOU:  · Understand these instructions.  · Will watch your condition.  · Will get help right away if you are not doing well or get worse.     This information is not intended to replace advice given to you by your health care provider. Make sure you discuss any questions you have with your health care provider.     Document Released: 09/27/2006 Document Revised: 12/23/2014 Document Reviewed: 08/25/2014  [employer] Interactive Patient Education ©2016 [employer]            Patient Information     Patient Information    Following emergency treatment: all patient requiring follow-up care must return either to a private physician or a clinic if your condition worsens before you are able to obtain further medical attention, please return to the emergency room.     Billing Information    At [facility], we work to make the billing process streamlined for our patients.  Our Representatives are here to answer any questions you may have regarding your hospital bill.  If you have insurance coverage and have supplied your insurance information to us, we will submit a claim to your insurer on your behalf.  Should you have any questions regarding your bill, we can be reached online or by phone as follows:  Online: You are able pay your bills online or live chat with our representatives about any billing questions you may have.  We are here to help Monday - Friday from 8:00am to 7:30pm and 9:00am - 12:00pm on Saturdays.  Please visit [email or web address]  for more information.   Phone:  [phone] or [phone]    Please note that your emergency physician, surgeon, pathologist, radiologist, anesthesiologist, and other specialists are not employed by [facility] and will therefore bill separately for their services.  Please contact them directly for any questions concerning their bills at the numbers below:     Emergency Physician Services:  [phone]  [facility]:  [phone]  [facility]:  [phone]  [facility]:  [phone]    1. Your final bill may vary from the amount quoted upon discharge if all procedures are not complete at that time, or if your doctor has additional procedures of which we are not aware. You will receive an additional bill if you return to the Emergency Department at [facility] for suture removal regardless of the facility of which the sutures were placed.     2. Please arrange for settlement of this account at the emergency registration.    3. All self-pay accounts are due in full at the time of treatment.  If you are unable to meet this obligation then payment is expected within 4-5 days.     4. If you have had radiology studies (CT, X-ray, Ultrasound, MRI), you have received a preliminary result during your emergency department visit. Please contact the radiology department [phone] to receive a copy of your final result. Please discuss the Final result with your primary physician or with the follow up physician provided.     Crisis Hotline:  [place] Crisis Hotline:  2-427-VWNCAGL or 1-253.257.3489  [place] Crisis Hotline:    1-600.917.2524 or 544-821-2614         ED Discharge Follow Up Questions    1. In order to provide you with very good care, we would like to follow up with a phone call in the next few days.   May we have your permission to contact you?     YES /  NO    2. What is the best phone number to call you? (       )_____-__________    3. What is the best time to call you?      Morning  /  Afternoon  /  Evening                   Patient Signature:  ____________________________________________________________    Date:  ____________________________________________________________

## 2017-03-04 NOTE — ED NOTES
Blood drawn and sent to lab.  Patient and significant other updated on POC.  Call light within reach.

## 2017-03-05 NOTE — H&P
CHIEF COMPLAINT:  Threatened .    HISTORY OF PRESENT ILLNESS:  This is a 21-year-old  female G1, P0 who   was consulted to me by Dr. Roland and for possible miscarriage versus   ectopic pregnancy.  Patient was seen on the 1st with complaints of bleeding   and cramping.  She had an ultrasound at that time showing a 3-mm gestational   sac, which measured 5 weeks and 0 days and she had a quant of 1137 at that   time.  She was instructed to follow up in a couple of days later for repeat   quant.  Her previous quant was 1023 on the 1st.  She only has slight vaginal   spotting and no more pain.  After discussion with first phone consultation   with the ER doctor, we agreed that she should have a second ultrasound to   evaluate for viable pregnancy and ectopic.  This was performed showing at 3.7   mm gestational sac versus pseudocyst.  No yolk pole seen or yolk sac seen.    There are no adnexal masses.  No free fluid, either.  The patient otherwise   has no pain, only a slight amount of bleeding, which has been slowing.  She is   Rh positive.  She denies fever.  This was a wanted pregnancy.    PAST MEDICAL HISTORY:  None.    PAST SURGICAL HISTORY:  None.    ALLERGIES:  TO PENICILLIN.    SOCIAL HISTORY:  Denies tobacco, alcohol, or drug use.    MEDICATIONS:  Vitamins.    FAMILY HISTORY:  Noncontributory.    REVIEW OF SYSTEMS:  As in the HPI, otherwise negative for greater than 10   systesm.  Patient denies any chest pain nor heart palpitations.  No signs or   symptoms of DVT.    PHYSICAL EXAMINATION:  VITAL SIGNS:  Stable.  Patient is afebrile.  Vital signs are within normal   limits.  GENERAL:  No apparent distress.  HEENT:  Normal.  LUNGS:  Clear to auscultation bilaterally.  CARDIOVASCULAR:  Regular rate and rhythm.  ABDOMEN:  Soft, nontender, and nondistended.  No masses.  No guarding,   rebound, no peritoneal signs.  GENITOURINARY:  Normal per previous ER physician's exam.  EXTREMITIES:  No signs or  symptoms of DVT.  LYMPHATICS:  No abnormal lymph nodes.    ASSESSMENT:  Probable missed  versus threatened  versus   ectopic pregnancy.    PLAN:  I counseled the patient on risks, benefits, and alternatives of   different therapies.  I offered her D and C to evaluate for products of   conception as it is extremely unlikely this a viable pregnancy secondary to   quant only increasing 100 in 4 days.  She also does not have ultrasound   significant growth in that time either.  I also offered expectant management,   stating that if this is a missed  that eventually most woman would   pass this on their own.  We gave her precautions for infection or ectopic   pregnancy.  We had a long discussion about the possibility of ectopic   pregnancy and pseudocyst formation, although ultrasound does not show any   ectopic pregnancy, it could be that she does have this and she is to followup   of right away with worsening bleeding or pain or any other concerns.  After   discussion, patient declines any kind of surgical intervention including   laparoscopy to evaluate for ectopic pregnancy or D and C.  She wants expectant   management.  She will follow up in 2 days to the emergency room for a beta   hCG level, and she was instructed to have the ER physicians give me a call   with that result.  She states she would do this during the working hour days   as I will be working that time.  I discussed that Dr. Roland and he feels   comfortable with the plan and will set the orders for the hCG that way.    Patient is also without insurance and received a social work consult today to   help her with that.  It is my understanding that the  helped her   fill out Medicaid papers and this process has already been started for her.       ____________________________________     MD INGRID SAUCEDA / OCTAVIO    DD:  2017 14:36:48  DT:  2017 21:58:13    D#:  [id number]  Job#:  [id number]

## 2017-03-05 NOTE — ED PROVIDER NOTES
ED Provider Note    CHIEF COMPLAINT  Chief Complaint   Patient presents with   • Vaginal Bleeding     Pt BIB self to triage for c/o vag bleed since Mon, pt reports recent visit on Wed, told to come back for recheck. Pt reports bright red blood/ 2-3 liner pads a days/ pt deneis cramping now.       HPI  Camila Enamorado is a 21 y.o. female who presents to the emergency room today with complaints of vaginal bleeding with pregnancy. Patient's last period was 17, presents to test positive she is  1 para 0. Has had no prenatal care was seen here recently on 3 one ultrasound showed possible gestational sac, Rh was positive for quantitative hCG was 1022. Patient was to follow up with her primary care physician which she does not she is returned stating that the bleeding has improved although she still has some spotting there is no further abdominal pain, patient denies chest pain, shortness of breath, fever or chills or changes to bowel/bladder,    REVIEW OF SYSTEMS  See HPI for further details. All other systems are negative.      PAST MEDICAL HISTORY  History reviewed. No pertinent past medical history.    FAMILY HISTORY  Family History   Problem Relation Age of Onset   • Cancer Mother      hodkins lymphoma       SOCIAL HISTORY  Social History     Social History   • Marital Status: Single     Spouse Name: N/A   • Number of Children: N/A   • Years of Education: N/A     Social History Main Topics   • Smoking status: Never Smoker    • Smokeless tobacco: None   • Alcohol Use: No   • Drug Use: No   • Sexual Activity: No      Comment: menses regular     Other Topics Concern   • None     Social History Narrative       SURGICAL HISTORY  History reviewed. No pertinent past surgical history.    CURRENT MEDICATIONS  Home Medications     Reviewed by Kristen Landers R.N. (Registered Nurse) on 17 at 1124  Med List Status: Complete    Medication Last Dose Status    nitrofurantoin monohydr macro (MACROBID) 100 MG Cap  "not taking Active    Prenatal MV-Min-Fe Fum-FA-DHA (PRENATAL 1 PO) 3/4/2017 Active                ALLERGIES  Allergies   Allergen Reactions   • Pcn [Penicillins]        PHYSICAL EXAM  VITAL SIGNS: /63 mmHg  Pulse 82  Temp(Src) 36.6 °C (97.8 °F)  Resp 16  Ht 1.778 m (5' 10\")  Wt 77.9 kg (171 lb 11.8 oz)  BMI 24.64 kg/m2  SpO2 100%  LMP 01/17/2017 Room air O2: 100    Constitutional: Well developed, Well nourished, No acute distress, Non-toxic appearance.   HENT: Normocephalic, Atraumatic, Bilateral external ears normal, Oropharynx moist, No oral exudates, Nose normal.   Eyes: PERRLA, EOMI, Conjunctiva normal, No discharge.   Neck: Normal range of motion, No tenderness, Supple, No stridor.   Lymphatic: No lymphadenopathy noted.   Cardiovascular: Normal heart rate, Normal rhythm, No murmurs, No rubs, No gallops.   Thorax & Lungs: Normal breath sounds, No respiratory distress, No wheezing, No chest tenderness.   Abdomen: Bowel sounds normal, Soft, No tenderness, No masses, No pulsatile masses.   Genitalia: Deferred patient declined at this time  Skin: Warm, Dry, No erythema, No rash.   Back: No tenderness, No CVA tenderness.     RADIOLOGY/PROCEDURES  US-OB PELVIS TRANSVAGINAL   Final Result      Stable findings with irregular cystic structure in the uterus could represent early IUP, blighted ovum versus pseudocyst sac of ectopic.            COURSE & MEDICAL DECISION MAKING  Pertinent Labs & Imaging studies reviewed. (See chart for details)  Patient was seen by Dr. Mazariegos GYN physician on-call today. Patient wishes to have 48 hour repeat quantitative hCG performed and will return to the emergency room. She does not have physician for follow-up. Dr. Mazariegos does say that he is on call in 48 hours and would like to be notified when patient returns for further plan and care. Patient is to return earlier if she has increased bleeding, increased pain, fever. Most likely represents incomplete AB or blighted ovum " although ectopic pregnancy is not completely been ruled out at this time patient is in no distress on examination both patient and her significant other verbalized understanding instructions and need for follow-up as described above.    FINAL IMPRESSION  1. Acute vaginal bleeding with pregnancy  2.   3.      Electronically signed by: Fabrice Roland, 3/4/2017 5:45 PM

## 2017-10-12 ENCOUNTER — HOSPITAL ENCOUNTER (OUTPATIENT)
Facility: MEDICAL CENTER | Age: 22
End: 2017-10-12
Attending: SPECIALIST
Payer: MEDICAID

## 2017-10-12 PROCEDURE — 88305 TISSUE EXAM BY PATHOLOGIST: CPT

## 2017-10-13 LAB — PATHOLOGY CONSULT NOTE: NORMAL

## 2017-12-17 PROCEDURE — 99284 EMERGENCY DEPT VISIT MOD MDM: CPT

## 2017-12-18 ENCOUNTER — HOSPITAL ENCOUNTER (EMERGENCY)
Facility: MEDICAL CENTER | Age: 22
End: 2017-12-18
Attending: EMERGENCY MEDICINE
Payer: MEDICAID

## 2017-12-18 VITALS
HEIGHT: 70 IN | WEIGHT: 148.59 LBS | BODY MASS INDEX: 21.27 KG/M2 | HEART RATE: 229 BPM | DIASTOLIC BLOOD PRESSURE: 71 MMHG | SYSTOLIC BLOOD PRESSURE: 121 MMHG | TEMPERATURE: 98.4 F | OXYGEN SATURATION: 98 % | RESPIRATION RATE: 14 BRPM

## 2017-12-18 DIAGNOSIS — N30.90 CYSTITIS: ICD-10-CM

## 2017-12-18 LAB
APPEARANCE UR: ABNORMAL
BACTERIA #/AREA URNS HPF: ABNORMAL /HPF
BACTERIA GENITAL QL WET PREP: NORMAL
BILIRUB UR QL STRIP.AUTO: NEGATIVE
C TRACH DNA SPEC QL NAA+PROBE: NEGATIVE
COLOR UR: YELLOW
CULTURE IF INDICATED INDCX: YES UA CULTURE
EPI CELLS #/AREA URNS HPF: ABNORMAL /HPF
GLUCOSE UR STRIP.AUTO-MCNC: NEGATIVE MG/DL
HCG UR QL: NEGATIVE
HYALINE CASTS #/AREA URNS LPF: ABNORMAL /LPF
KETONES UR STRIP.AUTO-MCNC: 15 MG/DL
LEUKOCYTE ESTERASE UR QL STRIP.AUTO: ABNORMAL
MICRO URNS: ABNORMAL
MUCOUS THREADS #/AREA URNS HPF: ABNORMAL /HPF
N GONORRHOEA DNA SPEC QL NAA+PROBE: NEGATIVE
NITRITE UR QL STRIP.AUTO: NEGATIVE
PH UR STRIP.AUTO: 5.5 [PH]
PROT UR QL STRIP: 100 MG/DL
RBC # URNS HPF: ABNORMAL /HPF
RBC UR QL AUTO: ABNORMAL
SIGNIFICANT IND 70042: NORMAL
SITE SITE: NORMAL
SOURCE SOURCE: NORMAL
SP GR UR REFRACTOMETRY: 1.03
SP GR UR STRIP.AUTO: 1.02
SPECIMEN SOURCE: NORMAL
UROBILINOGEN UR STRIP.AUTO-MCNC: 0.2 MG/DL
WBC #/AREA URNS HPF: ABNORMAL /HPF

## 2017-12-18 PROCEDURE — 700102 HCHG RX REV CODE 250 W/ 637 OVERRIDE(OP): Performed by: EMERGENCY MEDICINE

## 2017-12-18 PROCEDURE — 87086 URINE CULTURE/COLONY COUNT: CPT

## 2017-12-18 PROCEDURE — 87491 CHLMYD TRACH DNA AMP PROBE: CPT

## 2017-12-18 PROCEDURE — 87591 N.GONORRHOEAE DNA AMP PROB: CPT

## 2017-12-18 PROCEDURE — 700111 HCHG RX REV CODE 636 W/ 250 OVERRIDE (IP): Performed by: EMERGENCY MEDICINE

## 2017-12-18 PROCEDURE — 87077 CULTURE AEROBIC IDENTIFY: CPT

## 2017-12-18 PROCEDURE — A9270 NON-COVERED ITEM OR SERVICE: HCPCS | Performed by: EMERGENCY MEDICINE

## 2017-12-18 PROCEDURE — 96372 THER/PROPH/DIAG INJ SC/IM: CPT

## 2017-12-18 PROCEDURE — 87186 SC STD MICRODIL/AGAR DIL: CPT

## 2017-12-18 PROCEDURE — 81001 URINALYSIS AUTO W/SCOPE: CPT

## 2017-12-18 PROCEDURE — 81025 URINE PREGNANCY TEST: CPT

## 2017-12-18 RX ORDER — CEFTRIAXONE SODIUM 250 MG/1
250 INJECTION, POWDER, FOR SOLUTION INTRAMUSCULAR; INTRAVENOUS ONCE
Status: COMPLETED | OUTPATIENT
Start: 2017-12-18 | End: 2017-12-18

## 2017-12-18 RX ORDER — NITROFURANTOIN 25; 75 MG/1; MG/1
100 CAPSULE ORAL 2 TIMES DAILY
Qty: 20 CAP | Refills: 0 | Status: SHIPPED | OUTPATIENT
Start: 2017-12-18 | End: 2017-12-28

## 2017-12-18 RX ORDER — AZITHROMYCIN 250 MG/1
100 TABLET, FILM COATED ORAL ONCE
Status: COMPLETED | OUTPATIENT
Start: 2017-12-18 | End: 2017-12-18

## 2017-12-18 RX ORDER — ONDANSETRON 4 MG/1
4 TABLET, ORALLY DISINTEGRATING ORAL ONCE
Status: COMPLETED | OUTPATIENT
Start: 2017-12-18 | End: 2017-12-18

## 2017-12-18 RX ADMIN — AZITHROMYCIN 250 MG: 250 TABLET, FILM COATED ORAL at 04:40

## 2017-12-18 RX ADMIN — CEFTRIAXONE SODIUM 250 MG: 250 INJECTION, POWDER, FOR SOLUTION INTRAMUSCULAR; INTRAVENOUS at 04:41

## 2017-12-18 RX ADMIN — ONDANSETRON 4 MG: 4 TABLET, ORALLY DISINTEGRATING ORAL at 04:51

## 2017-12-18 ASSESSMENT — LIFESTYLE VARIABLES: DO YOU DRINK ALCOHOL: NO

## 2017-12-18 NOTE — ED PROVIDER NOTES
"ED Provider Note    CHIEF COMPLAINT  Chief Complaint   Patient presents with   • Urinary Frequency     Pt. states frequent urination today with severe urge to urinate and inability to hold urine until getting to a bathroom. Pt. states pain when urinating.   • Blood in Urine     Pt. states she thought there was blood in urine. Pt. states hx of UTI.       HPI  Camila Enamorado is a 22 y.o. female who presentsTo the emergency department with urinary frequency as well as dysuria. The patient states this started several hours prior to arrival. She is also noticed some blood in her urine. She does not have any back pain. She does not have any irregular vaginal bleeding or discharge. At this time she does not have any abdominal pain.    REVIEW OF SYSTEMS  See HPI for further details. All other systems are negative.     PAST MEDICAL HISTORY  Past Medical History:   Diagnosis Date   • Miscarriage    • UTI (urinary tract infection) 03/2017       SOCIAL HISTORY  Social History     Social History   • Marital status: Single     Spouse name: N/A   • Number of children: N/A   • Years of education: N/A     Social History Main Topics   • Smoking status: Never Smoker   • Smokeless tobacco: Not on file   • Alcohol use No   • Drug use: No   • Sexual activity: No      Comment: menses regular     Other Topics Concern   • Not on file     Social History Narrative   • No narrative on file           PHYSICAL EXAM  VITAL SIGNS: /71   Pulse (!) 101   Temp 36.9 °C (98.4 °F)   Resp 14   Ht 1.778 m (5' 10\")   Wt 67.4 kg (148 lb 9.4 oz)   LMP 01/17/2017   SpO2 98%   BMI 21.32 kg/m²   Constitutional: Well developed, Well nourished, No acute distress, Non-toxic appearance.   HENT: Normocephalic, Atraumatic, tympanic membranes are intact and nonerythematous bilaterally, Oropharynx moist without exudates or erythema, Nose normal.   Eyes: PERRLA, EOMI, Conjunctiva normal.  Neck: Supple without meningismus  Lymphatic: No lymphadenopathy " noted.   Cardiovascular:Slightly tachycardic heart rate, Normal rhythm, No murmurs, No rubs, No gallops.   Thorax & Lungs: Normal breath sounds, No respiratory distress, No wheezing, No chest tenderness.   Abdomen: Bowel sounds normal, Soft, No tenderness, no rebound, no guarding, no distention, No masses, No pulsatile masses.  Pelvic exam the patient has a mild-to-moderate amount of discharge in the vaginal vault, no cervical motion tenderness, no adnexal or uterine tenderness.   Skin: Warm, Dry, No erythema, No rash.   Back: No tenderness, No CVA tenderness.   Extremities: Atraumatic with symmetric distal pulses, No edema, No tenderness, No cyanosis, No clubbing.   Neurologic: Alert & oriented x 3, cranial nerves II through XII are intact, Normal motor function, Normal sensory function, No focal deficits noted.   Psychiatric: Affect normal, Judgment normal, Mood normal.     COURSE & MEDICAL DECISION MAKING  Pertinent Labs & Imaging studies reviewed. (See chart for details)  This a 22-year-old female who presents emergency department with dysuria, hematuria, and vaginal discharge. Urinalysis was ordered and this does show evidence of urinary tract infection however the patient started state that she had some vaginal discharge therefore pelvic exam was performed and a wet prep as well as gonorrhea and chlamydia is pending. I signed the case out to Dr. Castellon for workup and disposition..      Electronically signed by: Johny Horn, 12/18/2017 3:20 AM

## 2017-12-18 NOTE — ED NOTES
"Camila Enamorado  22 y.o.  Chief Complaint   Patient presents with   • Urinary Frequency     Pt. states frequent urination today with severe urge to urinate and inability to hold urine until getting to a bathroom. Pt. states pain when urinating.   • Blood in Urine     Pt. states she thought there was blood in urine. Pt. states hx of UTI.     /68   Pulse (!) 123   Temp 36.9 °C (98.5 °F)   Resp 16   Ht 1.778 m (5' 10\")   Wt 67.4 kg (148 lb 9.4 oz)   LMP 01/17/2017   SpO2 97%   BMI 21.32 kg/m²   Pt. Returned to ER lobby and educated to inform triage RN of any new or worsening symptoms. Pt provided specimen cup for urine sample.  "

## 2017-12-18 NOTE — LETTER
12/20/2017               Camila Enamorado  [address] [facility] [address]        Dear Camila (MR#[id number])    This letter is sent in regards to your, recent visit to the [facility] Emergency Department on 12/17/2017.  During the visit, tests were performed to assist the physician in a medical diagnosis.  A review of those tests requires that we notify you of the following:    Your urine culture was POSITIVE for a bacteria called Escherichia coli. The antibiotic prescribed for you (nitrofurantoin) should be active to treat this bacteria. IT IS IMPORTANT THAT YOU CONTINUE TAKING YOUR ANTIBIOTIC UNTIL IT IS FINISHED.      Please feel free to contact me at the number below if you have any questions or concerns. Thank you for your cooperation in the matter.    Sincerely,  ED Culture Follow-Up Staff  Sarah Ovalles, PharmD    [facility], Emergency Department  [address] [place] [address] [id number] [phone] (ED Culture Line)  [phone]

## 2017-12-18 NOTE — ED NOTES
All lines and monitors DC'd.  Discharge instructions given, questions answered.  Ambulatory out of ER, escorted by RN.  Pt reports all belongings in possession.  Instructed not to drive after taking pain meds and pt verbalizes understanding.  Rx x one given.

## 2017-12-18 NOTE — DISCHARGE INSTRUCTIONS
Urinary Tract Infection  A urinary tract infection (UTI) can occur any place along the urinary tract. The tract includes the kidneys, ureters, bladder, and urethra. A type of germ called bacteria often causes a UTI. UTIs are often helped with antibiotic medicine.   HOME CARE   · If given, take antibiotics as told by your doctor. Finish them even if you start to feel better.  · Drink enough fluids to keep your pee (urine) clear or pale yellow.  · Avoid tea, drinks with caffeine, and bubbly (carbonated) drinks.  · Pee often. Avoid holding your pee in for a long time.  · Pee before and after having sex (intercourse).  · Wipe from front to back after you poop (bowel movement) if you are a woman. Use each tissue only once.  GET HELP RIGHT AWAY IF:   · You have back pain.  · You have lower belly (abdominal) pain.  · You have chills.  · You feel sick to your stomach (nauseous).  · You throw up (vomit).  · Your burning or discomfort with peeing does not go away.  · You have a fever.  · Your symptoms are not better in 3 days.  MAKE SURE YOU:   · Understand these instructions.  · Will watch your condition.  · Will get help right away if you are not doing well or get worse.     This information is not intended to replace advice given to you by your health care provider. Make sure you discuss any questions you have with your health care provider.     Document Released: 06/05/2009 Document Revised: 01/08/2016 Document Reviewed: 07/18/2013  [employer] Interactive Patient Education ©2016 [employer]

## 2017-12-18 NOTE — ED PROVIDER NOTES
"ED Provider Note      CHIEF COMPLAINT   Dysuria    HPI   Camila Enamorado is a 22 y.o. female who presented to the emergency Department with complaints of dysuria and frequency as well as vaginal discharge. She was seen by my partner earlier. He performed a pelvic exam because of reports of vaginal discharge as well. She did not have findings consistent with PID, but did have a fair amount of white/yellow vaginal discharge. She reports this was going on for about the last week or so. She does have a history of Chlamydia that was treated a while ago. She reports her partner was treated as well. Patient denies any flank pain or back pain. She's not had fever. No nausea or vomiting. Denies abdominal pain. She is not pregnant that she is aware of.    Chart reviewed. Patient had a urinary tract infection treated successfully with Macrobid in March. A urine culture was not sent at that time.    REVIEW OF SYSTEMS  As per HPI     PAST MEDICAL HISTORY  Past Medical History:   Diagnosis Date   • Miscarriage    • UTI (urinary tract infection) 03/2017       FAMILY HISTORY  Family History   Problem Relation Age of Onset   • Cancer Mother      hodkins lymphoma       SOCIAL HISTORY  Social History   Substance Use Topics   • Smoking status: Never Smoker   • Smokeless tobacco: Not on file   • Alcohol use No       SURGICAL HISTORY  No past surgical history on file.    CURRENT MEDICATIONS  Home Medications     Reviewed by Shukri Arvizu RSarahNSarah (Registered Nurse) on 12/18/17 at 0302  Med List Status: Not Addressed   Medication Last Dose Status   nitrofurantoin monohydr macro (MACROBID) 100 MG Cap not taking Active   Prenatal MV-Min-Fe Fum-FA-DHA (PRENATAL 1 PO) 3/4/2017 Active                ALLERGIES  Allergies   Allergen Reactions   • Pcn [Penicillins]        PHYSICAL EXAM  VITAL SIGNS: /71   Pulse (!) 229   Temp 36.9 °C (98.4 °F)   Resp 14   Ht 1.778 m (5' 10\")   Wt 67.4 kg (148 lb 9.4 oz)   LMP 01/17/2017   SpO2 98%  "  BMI 21.32 kg/m²   Constitutional: No acute distress  HENT:Grossly normal  Eyes: Normal inspection  Neck: Grossly normal range of motion  Lymphatic: No lymphadenopathy noted.   Cardiovascular: Normal heart rate  Thorax & Lungs: No respiratory distress  Abdomen: Bowel sounds normal, soft, non-distended, nontender, no mass  Skin: Warm, Dry, No rash.   Psychiatric: Affect normal    LABS:  Results for orders placed or performed during the hospital encounter of 12/18/17   CHLAMYDIA/GC PCR URINE OR SWAB   Result Value Ref Range    Source Urine    URINALYSIS,CULTURE IF INDICATED   Result Value Ref Range    Color Yellow     Character Cloudy (A)     Specific Gravity 1.021 <1.035    Ph 5.5 5.0 - 8.0    Glucose Negative Negative mg/dL    Ketones 15 (A) Negative mg/dL    Protein 100 (A) Negative mg/dL    Bilirubin Negative Negative    Urobilinogen, Urine 0.2 Negative    Nitrite Negative Negative    Leukocyte Esterase Large (A) Negative    Occult Blood Large (A) Negative    Micro Urine Req Microscopic     Culture Indicated Yes UA Culture   BETA-HCG QUALITATIVE URINE   Result Value Ref Range    Beta-Hcg Urine Negative Negative   REFRACTOMETER SG   Result Value Ref Range    Specific Gravity 1.030    URINE MICROSCOPIC (W/UA)   Result Value Ref Range    WBC Packed WBC /hpf    -150 (A) /hpf    Bacteria Few (A) None /hpf    Epithelial Cells Few /hpf    Hyaline Cast 0-2 /lpf    Mucous Threads Few /hpf   WET PREP   Result Value Ref Range    Significant Indicator NEG     Source GEN     Site VAGINAL     Wet Prep For Parasites       Few WBC's seen.  No motile Trichomonas seen.  No clue cells seen.  No yeast.     CHLAMYDIA & GC BY PCR   Result Value Ref Range    Source Urine        COURSE & MEDICAL DECISION MAKING  Patient presents with symptoms consistent with urinary tract infection. She has however also had vaginal discharge. Specimens were obtained. Urinalysis is consistent with UTI. Given her possible STD she is treated with  Rocephin intramuscularly and oral azithromycin. Cystitis will be treated with Macrobid. No suggestion of upper tract infection. Advised plenty of fluids. She will return to the ER for high fevers, back pain, worsening, not improving or concern. Referred to the [facility] for primary medical care    Patient referred to primary for blood pressure management    FINAL IMPRESSION  1. Cystitis with hematuria  2. Vaginal discharge, rule out gonorrhea/chlamydia        This dictation was created using voice recognition software. The accuracy of the dictation is limited to the abilities of the software.  The nursing notes were reviewed and certain aspects of this information were incorporated into this note.    Electronically signed by: Shukri Castellon, 12/18/2017 5:13 AM

## 2017-12-20 LAB
BACTERIA UR CULT: ABNORMAL
BACTERIA UR CULT: ABNORMAL
SIGNIFICANT IND 70042: ABNORMAL
SITE SITE: ABNORMAL
SOURCE SOURCE: ABNORMAL

## 2017-12-20 NOTE — ED NOTES
ED Positive Culture Follow-up/Notification Note:    Date: 12/20/17     Patient seen in the ED on 12/17/2017 for dysuria and frequency with vaginal discharge.   1. Cystitis       Discharge Medication List as of 12/18/2017  4:28 AM      START taking these medications    Details   nitrofurantoin monohydr macro (MACROBID) 100 MG Cap Take 1 Cap by mouth 2 times a day for 10 days., Disp-20 Cap, R-0, Print Rx Paper             Allergies: Pcn [penicillins]     Final cultures:   Results     Procedure Component Value Units Date/Time    URINE CULTURE(NEW) [[id number]]  (Abnormal)  (Susceptibility) Collected:  12/18/17 0220    Order Status:  Completed Specimen:  Urine Updated:  12/20/17 1035     Significant Indicator POS (POS)     Source UR     Site --     Urine Culture -- (A)     Urine Culture -- (A)     Escherichia coli  ,000 cfu/mL      Culture & Susceptibility     ESCHERICHIA COLI     Antibiotic Sensitivity Microscan Unit Status    Ampicillin Resistant >16 mcg/mL Final    Cefepime Sensitive <=8 mcg/mL Final    Cefotaxime Sensitive <=2 mcg/mL Final    Cefotetan Sensitive <=16 mcg/mL Final    Ceftazidime Sensitive <=1 mcg/mL Final    Ceftriaxone Sensitive <=8 mcg/mL Final    Cefuroxime Sensitive <=4 mcg/mL Final    Cephalothin Resistant >16 mcg/mL Final    Ciprofloxacin Sensitive <=1 mcg/mL Final    Gentamicin Resistant >8 mcg/mL Final    Levofloxacin Sensitive <=2 mcg/mL Final    Nitrofurantoin Sensitive <=32 mcg/mL Final    Pip/Tazobactam Sensitive <=16 mcg/mL Final    Piperacillin Resistant >64 mcg/mL Final    Tigecycline Sensitive <=2 mcg/mL Final    Tobramycin Intermediate 8 mcg/mL Final    Trimeth/Sulfa Resistant >2/38 mcg/mL Final                       CHLAMYDIA & GC BY PCR [[id number]] Collected:  12/18/17 0350    Order Status:  Completed Specimen:  Genital from Genital Updated:  12/18/17 1443     Source Urine     C. trachomatis by PCR Negative     N. gonorrhoeae by PCR Negative    WET PREP [[id number]] Collected:   12/18/17 0350    Order Status:  Completed Specimen:  Genital from Vaginal Updated:  12/18/17 0419     Significant Indicator NEG     Source GEN     Site VAGINAL     Wet Prep For Parasites --     Few WBC's seen.  No motile Trichomonas seen.  No clue cells seen.  No yeast.      URINALYSIS,CULTURE IF INDICATED [[id number]]  (Abnormal) Collected:  12/18/17 0220    Order Status:  Completed Specimen:  Urine Updated:  12/18/17 0333     Color Yellow     Character Cloudy (A)     Specific Gravity 1.021     Ph 5.5     Glucose Negative mg/dL      Ketones 15 (A) mg/dL      Protein 100 (A) mg/dL      Bilirubin Negative     Urobilinogen, Urine 0.2     Nitrite Negative     Leukocyte Esterase Large (A)     Occult Blood Large (A)     Micro Urine Req Microscopic     Culture Indicated Yes UA Culture     URINALYSIS,CULTURE IF INDICATED [[id number]]     Order Status:  Canceled Specimen:  Urine from Urine, Clean Catch     CHLAMYDIA/GC PCR URINE OR SWAB [[id number]] Collected:  12/18/17 0220    Order Status:  Canceled Specimen:  Urine from Urine, First Catch Updated:  12/18/17 0245          Plan:   Appropriate antibiotic therapy prescribed. No changes required based upon culture result.  Sent letter to patient to notify of positive culture result and encourage compliance with prescribed antibiotics.     Sarah Ovalles

## 2018-03-08 ENCOUNTER — OFFICE VISIT (OUTPATIENT)
Dept: URGENT CARE | Facility: PHYSICIAN GROUP | Age: 23
End: 2018-03-08

## 2018-03-08 VITALS
BODY MASS INDEX: 21.99 KG/M2 | SYSTOLIC BLOOD PRESSURE: 108 MMHG | DIASTOLIC BLOOD PRESSURE: 64 MMHG | TEMPERATURE: 99 F | HEIGHT: 70 IN | OXYGEN SATURATION: 98 % | RESPIRATION RATE: 12 BRPM | WEIGHT: 153.6 LBS | HEART RATE: 96 BPM

## 2018-03-08 DIAGNOSIS — L02.611 ABSCESS OF GREAT TOE OF RIGHT FOOT: ICD-10-CM

## 2018-03-08 PROCEDURE — 99203 OFFICE O/P NEW LOW 30 MIN: CPT | Performed by: NURSE PRACTITIONER

## 2018-03-08 RX ORDER — SULFAMETHOXAZOLE AND TRIMETHOPRIM 800; 160 MG/1; MG/1
1 TABLET ORAL 2 TIMES DAILY
Qty: 14 TAB | Refills: 0 | Status: SHIPPED | OUTPATIENT
Start: 2018-03-08

## 2018-03-08 ASSESSMENT — ENCOUNTER SYMPTOMS
HEADACHES: 0
NAUSEA: 0
MYALGIAS: 0
FEVER: 0
CHILLS: 0

## 2018-03-08 ASSESSMENT — PAIN SCALES - GENERAL: PAINLEVEL: 2=MINIMAL-SLIGHT

## 2018-03-08 NOTE — LETTER
March 8, 2018        Camila Enamorado  [address] [place] [address]        Camila was seen in our clinic today and she is excused from work for today and tomorrow. If you have any questions or concerns, please don't hesitate to call.        Sincerely,        VERONICA Garcia.    Electronically Signed

## 2018-03-09 NOTE — PROGRESS NOTES
"Subjective:      Camila Enamorado is a 22 y.o. female who presents with Toe Pain (Bump/blister on Rt big toe, hear to walk  x1week )            HPI New problem. 22 year old female with bump on underside of right great toe for one week. This is painful and makes ambulation difficult. No fever, chills, myagia, nausea or diarrhea. She has not done any treatment for this. She is on her feet a lot at work.  Pcn [penicillins]  Current Outpatient Prescriptions on File Prior to Visit   Medication Sig Dispense Refill   • Prenatal MV-Min-Fe Fum-FA-DHA (PRENATAL 1 PO) Take  by mouth.       No current facility-administered medications on file prior to visit.      Social History     Social History   • Marital status: Single     Spouse name: N/A   • Number of children: N/A   • Years of education: N/A     Occupational History   • Not on file.     Social History Main Topics   • Smoking status: Never Smoker   • Smokeless tobacco: Never Used   • Alcohol use No   • Drug use: No   • Sexual activity: No      Comment: menses regular     Other Topics Concern   • Not on file     Social History Narrative   • No narrative on file     family history includes Cancer in her mother.      Review of Systems   Constitutional: Negative for chills, fever and malaise/fatigue.   Gastrointestinal: Negative for nausea.   Musculoskeletal: Negative for myalgias.   Skin:        Lesion on underside of right great toe.   Neurological: Negative for headaches.          Objective:     /64   Pulse 96   Temp 37.2 °C (99 °F)   Resp 12   Ht 1.778 m (5' 10\")   Wt 69.7 kg (153 lb 9.6 oz)   LMP 02/15/2018   SpO2 98%   Breastfeeding? No   BMI 22.04 kg/m²      Physical Exam   Constitutional: She is oriented to person, place, and time. She appears well-developed and well-nourished. No distress.   Cardiovascular: Normal rate, regular rhythm and normal heart sounds.    No murmur heard.  Pulmonary/Chest: Breath sounds normal. No respiratory distress. "   Musculoskeletal: Normal range of motion.   Neurological: She is alert and oriented to person, place, and time.   Skin: Skin is warm and dry.   Quarter size fluctuant lesion to bottom of right great toe. Area cleaned with alcohol and unroofed with 18 gauge needle with purulent discharge noted on expression. No culture done due to insurance status of patient. Area irrigated with NS and dressed with polysporin and dressing.   Psychiatric: She has a normal mood and affect. Her behavior is normal. Thought content normal.   Nursing note and vitals reviewed.              Assessment/Plan:     1. Abscess of great toe of right foot    - sulfamethoxazole-trimethoprim (BACTRIM DS) 800-160 MG tablet; Take 1 Tab by mouth 2 times a day.  Dispense: 14 Tab; Refill: 0    Ibuprofen for pain  Epsom salt baths.  Bactrim x 7 days.  Educational handout with AVS

## 2018-06-27 ENCOUNTER — OFFICE VISIT (OUTPATIENT)
Dept: URGENT CARE | Facility: PHYSICIAN GROUP | Age: 23
End: 2018-06-27

## 2018-06-27 VITALS
HEIGHT: 70 IN | BODY MASS INDEX: 22.19 KG/M2 | TEMPERATURE: 98.6 F | DIASTOLIC BLOOD PRESSURE: 60 MMHG | SYSTOLIC BLOOD PRESSURE: 112 MMHG | RESPIRATION RATE: 12 BRPM | OXYGEN SATURATION: 99 % | HEART RATE: 99 BPM | WEIGHT: 155 LBS

## 2018-06-27 DIAGNOSIS — R30.0 DYSURIA: ICD-10-CM

## 2018-06-27 DIAGNOSIS — N30.00 ACUTE CYSTITIS WITHOUT HEMATURIA: ICD-10-CM

## 2018-06-27 LAB
APPEARANCE UR: NORMAL
BILIRUB UR STRIP-MCNC: NEGATIVE MG/DL
COLOR UR AUTO: YELLOW
GLUCOSE UR STRIP.AUTO-MCNC: NEGATIVE MG/DL
INT CON NEG: NEGATIVE
INT CON POS: POSITIVE
KETONES UR STRIP.AUTO-MCNC: NEGATIVE MG/DL
LEUKOCYTE ESTERASE UR QL STRIP.AUTO: NORMAL
NITRITE UR QL STRIP.AUTO: NORMAL
PH UR STRIP.AUTO: 6 [PH] (ref 5–8)
POC URINE PREGNANCY TEST: NEGATIVE
PROT UR QL STRIP: 100 MG/DL
RBC UR QL AUTO: NORMAL
SP GR UR STRIP.AUTO: 1.02
UROBILINOGEN UR STRIP-MCNC: NEGATIVE MG/DL

## 2018-06-27 PROCEDURE — 81025 URINE PREGNANCY TEST: CPT | Performed by: PHYSICIAN ASSISTANT

## 2018-06-27 PROCEDURE — 81002 URINALYSIS NONAUTO W/O SCOPE: CPT | Performed by: PHYSICIAN ASSISTANT

## 2018-06-27 PROCEDURE — 99214 OFFICE O/P EST MOD 30 MIN: CPT | Performed by: PHYSICIAN ASSISTANT

## 2018-06-27 RX ORDER — NITROFURANTOIN 25; 75 MG/1; MG/1
100 CAPSULE ORAL EVERY 12 HOURS
Qty: 10 CAP | Refills: 0 | Status: SHIPPED | OUTPATIENT
Start: 2018-06-27 | End: 2018-07-02

## 2018-06-27 ASSESSMENT — ENCOUNTER SYMPTOMS
RESPIRATORY NEGATIVE: 1
FLANK PAIN: 1
NAUSEA: 0
ABDOMINAL PAIN: 0
VOMITING: 0
CHILLS: 0
FEVER: 0
CARDIOVASCULAR NEGATIVE: 1
DIARRHEA: 0

## 2018-06-28 ENCOUNTER — TELEPHONE (OUTPATIENT)
Dept: URGENT CARE | Facility: PHYSICIAN GROUP | Age: 23
End: 2018-06-28

## 2018-06-28 NOTE — LETTER
June 28, 2018       Patient: Camila Enamorado   YOB: 1995   Date of Visit: 6/28/2018         To Whom It May Concern:    It is my medical opinion that Camila Enamorado remain out of work until 6/30/18.    If you have any questions or concerns, please don't hesitate to call [phone]          Sincerely,          Paresh Mariscal P.A.-C.  Electronically Signed

## 2018-06-28 NOTE — PROGRESS NOTES
Subjective:      Camila Enamorado is a 23 y.o. female who presents with Abdominal Pain (Constant painful bloating, radiates to lower back x3days )            Dysuria    This is a new problem. The current episode started in the past 7 days. The problem occurs every urination. The problem has been unchanged. The quality of the pain is described as burning. The pain is at a severity of 2/10. The pain is mild. There has been no fever. There is no history of pyelonephritis. Associated symptoms include flank pain, frequency and urgency. Pertinent negatives include no chills, hematuria, hesitancy, nausea or vomiting. She has tried nothing for the symptoms. The treatment provided no relief. There is no history of kidney stones or recurrent UTIs.   LMP: 6/6/18      PMH:  has a past medical history of Miscarriage and UTI (urinary tract infection) (03/2017).  MEDS:   Current Outpatient Prescriptions:   •  sulfamethoxazole-trimethoprim (BACTRIM DS) 800-160 MG tablet, Take 1 Tab by mouth 2 times a day., Disp: 14 Tab, Rfl: 0  •  Prenatal MV-Min-Fe Fum-FA-DHA (PRENATAL 1 PO), Take  by mouth., Disp: , Rfl:   ALLERGIES:   Allergies   Allergen Reactions   • Pcn [Penicillins]      SURGHX: No past surgical history on file.  SOCHX:  reports that she has never smoked. She has never used smokeless tobacco. She reports that she does not drink alcohol or use drugs.  FH: family history includes Cancer in her mother.    Review of Systems   Constitutional: Negative for chills and fever.   Respiratory: Negative.    Cardiovascular: Negative.    Gastrointestinal: Negative for abdominal pain, diarrhea, nausea and vomiting.   Genitourinary: Positive for dysuria, flank pain, frequency and urgency. Negative for hematuria and hesitancy.   All other systems reviewed and are negative.      Medications, Allergies, and current problem list reviewed today in Epic  Family history reviewed with patient and is not pertinent for today's visit     Objective:  "    /60   Pulse 99   Temp 37 °C (98.6 °F)   Resp 12   Ht 1.778 m (5' 10\")   Wt 70.3 kg (155 lb)   LMP 06/06/2018   SpO2 99%   Breastfeeding? No   BMI 22.24 kg/m²      Physical Exam   Constitutional: She is oriented to person, place, and time. She appears well-developed and well-nourished. No distress.   HENT:   Head: Normocephalic and atraumatic.   Eyes: Conjunctivae are normal.   Neck: Normal range of motion. Neck supple.   Cardiovascular: Normal rate, regular rhythm and normal heart sounds.    Pulmonary/Chest: Effort normal and breath sounds normal. No respiratory distress. She has no wheezes.   Abdominal: Soft. She exhibits no distension. There is no tenderness.   Musculoskeletal:   No flank pain or CVA tenderness bilaterally   Neurological: She is alert and oriented to person, place, and time.   Skin: Skin is warm and dry. She is not diaphoretic.   Psychiatric: She has a normal mood and affect. Her behavior is normal. Judgment and thought content normal.   Nursing note and vitals reviewed.         Urinalysis: Positive leukocytes, positive nitrates  Pregnancy: Negative     Assessment/Plan:     1. Dysuria  POCT Urinalysis    POCT PREGNANCY   2. Acute cystitis without hematuria  nitrofurantoin monohydr macro (MACROBID) 100 MG Cap     Take full course of antibiotic  Patient does not have insurance therefore declines urine culture today  Significantly increase fluids  OTC Motrin or Azo as needed  Cranberry as tolerated  Return to clinic or go to ED if symptoms worsen or persist. Indications for ED discussed at length. Patient voices understanding. Follow-up with your primary care provider in 3-5 days. Red flags discussed. All side effects of medication discussed including allergic response, GI upset, tendon injury, etc.    Please note that this dictation was created using voice recognition software. I have made every reasonable attempt to correct obvious errors, but I expect that there are errors of " grammar and possibly content that I did not discover before finalizing the note.

## 2021-02-26 NOTE — ED NOTES
Pt brought back to rm [address] from triage. Pt able to transfer self to bed, pt in gown, on monitor, family at bedside, call light in reach. Chart up for ERP.   Take the antibiotic, Augmentin, 1 pill twice a day for 10 days. Take with food.  To help prevent intestinal side effects while on antibiotic, you can either eat a little bit of yogurt each day or take an over-the-counter probiotic supplement daily while on the  Antibiotic.    Use the steroid nasal spray called Flonase - one spray each nostril once a day until the sinus symptoms have resolved.  You could consider using this medication every spring when your sinus symptoms typically start to flare to try and head off a sinus infection    There are many over-the-counter products you can use to help with sinus issues or sinus infections :    Sterile saline nasal sprays help moisturize and decongest nose.  Many people like the sinus rinses or Neti pot.   If you can tolerate them and do not have high blood pressure or heart issues, you can do a low dose decongestant pseudoephedrine 30mg (Sudafed)   You can also use a product containing guaifenesin ( Mucinex) to help loosen phlegm and congestion  Warm compresses over the sinus area  can be soothing.  Tylenol or ibuprofen can be used for pain.   A Vaporizer in your room while you sleep, especially during the winter, can be  helpful if you have one.    Drink plenty of fluids.  Get plenty of rest.    If your symptoms are not improving in the next 2-3 days, please contact your primary care physician or return to urgent care for re-evaluation.  Seek medical attention sooner if symptoms are worsening.    We will Contact you with your COVID test results within the next hour.  Please remain home in isolation until you receive her COVID test results

## 2023-07-05 NOTE — ED NOTES
Is This A New Presentation, Or A Follow-Up?: Skin Lesions Urine sent to lab, pt states she could possibility have been exposed to an STI without notification.    How Severe Is Your Skin Lesion?: moderate Have Your Skin Lesions Been Treated?: not been treated